# Patient Record
Sex: FEMALE | Race: WHITE | NOT HISPANIC OR LATINO | Employment: UNEMPLOYED | URBAN - METROPOLITAN AREA
[De-identification: names, ages, dates, MRNs, and addresses within clinical notes are randomized per-mention and may not be internally consistent; named-entity substitution may affect disease eponyms.]

---

## 2020-12-07 ENCOUNTER — OFFICE VISIT (OUTPATIENT)
Dept: OBSTETRICS AND GYNECOLOGY | Facility: CLINIC | Age: 30
End: 2020-12-07

## 2020-12-07 VITALS
SYSTOLIC BLOOD PRESSURE: 130 MMHG | WEIGHT: 197 LBS | DIASTOLIC BLOOD PRESSURE: 70 MMHG | TEMPERATURE: 97.5 F | BODY MASS INDEX: 31.66 KG/M2 | HEIGHT: 66 IN

## 2020-12-07 DIAGNOSIS — Z00.00 ANNUAL PHYSICAL EXAM: Primary | ICD-10-CM

## 2020-12-07 DIAGNOSIS — N94.6 DYSMENORRHEA: ICD-10-CM

## 2020-12-07 DIAGNOSIS — Z30.09 FAMILY PLANNING ADVICE: ICD-10-CM

## 2020-12-07 PROCEDURE — 99213 OFFICE O/P EST LOW 20 MIN: CPT | Performed by: OBSTETRICS & GYNECOLOGY

## 2020-12-07 PROCEDURE — 99385 PREV VISIT NEW AGE 18-39: CPT | Performed by: OBSTETRICS & GYNECOLOGY

## 2020-12-07 NOTE — PATIENT INSTRUCTIONS
Laparoscopic Tubal Ligation  Laparoscopic tubal ligation is a procedure to close the fallopian tubes. This is done so that you cannot get pregnant. When the fallopian tubes are closed, the eggs that your ovaries release cannot enter the uterus, and sperm cannot reach the released eggs.  You should not have this procedure if you want to get pregnant someday or if you are unsure about having more children.  Tell a health care provider about:  · Any allergies you have.  · All medicines you are taking, including vitamins, herbs, eye drops, creams, and over-the-counter medicines.  · Any problems you or family members have had with anesthetic medicines.  · Any blood disorders you have.  · Any surgeries you have had.  · Any medical conditions you have.  · Whether you are pregnant or may be pregnant.  · Any past pregnancies.  What are the risks?  Generally, this is a safe procedure. However, problems may occur, including:  · Infection.  · Bleeding.  · Injury to other organs in the abdomen.  · Side effects from anesthetic medicines.  · Failure of the procedure.  This procedure can increase your risk of a kind of pregnancy in which a fertilized egg attaches to the outside of the uterus (ectopic pregnancy).  What happens before the procedure?  Medicines  · Ask your health care provider about:  ? Changing or stopping your regular medicines. This is especially important if you are taking diabetes medicines or blood thinners.  ? Taking medicines such as aspirin and ibuprofen. These medicines can thin your blood. Do not take these medicines unless your health care provider tells you to take them.  ? Taking over-the-counter medicines, vitamins, herbs, and supplements.  Staying hydrated  · Follow instructions from your health care provider about hydration, which may include:  ? Up to 2 hours before the procedure - you may continue to drink clear liquids, such as water, clear fruit juice, black coffee, and plain tea.  Eating and  drinking  · Follow instructions from your health care provider about eating and drinking, which may include:  ? 8 hours before the procedure - stop eating heavy meals or foods, such as meat, fried foods, or fatty foods.  ? 6 hours before the procedure - stop eating light meals or foods, such as toast or cereal.  ? 6 hours before the procedure - stop drinking milk or drinks that contain milk.  ? 2 hours before the procedure - stop drinking clear liquids.  General instructions  · Do not use any products that contain nicotine or tobacco for at least 4 weeks before the procedure. These products include cigarettes, e-cigarettes, and chewing tobacco. If you need help quitting, ask your health care provider.  · Plan to have someone take you home from the hospital.  · If you will be going home right after the procedure, plan to have someone with you for 24 hours.  · Ask your health care provider:  ? How your surgery site will be marked.  ? What steps will be taken to help prevent infection. These may include:  § Removing hair at the surgery site.  § Washing skin with a germ-killing soap.  § Taking antibiotic medicine.  What happens during the procedure?         · An IV will be inserted into one of your veins.  · You will be given one or more of the following:  ? A medicine to help you relax (sedative).  ? A medicine to numb the area (local anesthetic).  ? A medicine to make you fall asleep (general anesthetic).  ? A medicine that is injected into an area of your body to numb everything below the injection site (regional anesthetic).  · Your bladder may be emptied with a small tube (catheter).  · If you have been given a general anesthetic, a tube will be put down your throat to help you breathe.  · Two small incisions will be made in your lower abdomen and near your belly button.  · Your abdomen will be inflated with a gas. This will let the surgeon see better and will give the surgeon room to work.  · A thin, lighted tube  (laparoscope) with a camera attached will be inserted into your abdomen through one of the incisions. Small instruments will be inserted through the other incision.  · The fallopian tubes will be tied off, burned (cauterized), or blocked with a clip, ring, or clamp. A small portion in the center of each fallopian tube may be removed.  · The gas will be released from the abdomen.  · The incisions will be closed with stitches (sutures).  · A bandage (dressing) will be placed over the incisions.  The procedure may vary among health care providers and hospitals.  What happens after the procedure?  · Your blood pressure, heart rate, breathing rate, and blood oxygen level will be monitored until you leave the hospital.  · You will be given medicine to help with pain, nausea, and vomiting as needed.  Summary  · Laparoscopic tubal ligation is a procedure that is done so that you cannot get pregnant.  · You should not have this procedure if you want to get pregnant someday or if you are unsure about having more children.  · The procedure is done using a thin, lighted tube (laparoscope) with a camera attached that will be inserted into your abdomen through an incision.  · Follow instructions from your health care provider about eating and drinking before the procedure.  This information is not intended to replace advice given to you by your health care provider. Make sure you discuss any questions you have with your health care provider.  Document Revised: 05/26/2020 Document Reviewed: 11/12/2019  [employer] Patient Education © 2020 [employer]

## 2020-12-07 NOTE — PROGRESS NOTES
GYN Annual Exam     CC - Here for annual exam.     Subjective   HPI  Cindy Keller is a 30 y.o. female, , who presents for annual well woman exam. Patient's last menstrual period was 2020 (approximate)..  Periods are irregular with IUD, lasting 10 days.  Dysmenorrhea:mild, occurring first 1-2 days of flow.  Patient reports problems with: none.  Partner Status: Marital Status: .  New Partners since last visit: no.  Desires STD Screening: no. She has a Mirena IUD and wants to discuss BTL . She has had PID twice since IUD insertion and wants a BTL. Currently her pain is well controlled with NSAIDs but she wants the IUD out regardless of placement.     US performed today and IUD in correct position. These results explained and all questions answered.     Additional OB/GYN History   Current contraception: contraceptive methods: IUD.  Insertion date: 2019  Desires to: continue contraception  Last Pap : 2019 negative  Last Completed Pap Smear       Status Date      PAP SMEAR No completions recorded        History of abnormal Pap smear: no  Family history of uterine, colon, breast, or ovarian cancer: no  Performs monthly Self-Breast Exam: yes  Exercises Regularly:yes  Feelings of Anxiety or Depression: no  Tobacco Usage?: Yes Cindy Keller  reports that she has been smoking cigarettes. She does not have any smokeless tobacco history on file.. I have educated her on the risk of diseases from using tobacco products such as cancer, COPD and heart disease.     I advised her to quit and she is not willing to quit.    I spent 3  minutes counseling the patient.        OB History        5    Para   4    Term   4            AB   1    Living   4       SAB   1    TAB        Ectopic        Molar        Multiple        Live Births   4                Health Maintenance   Topic Date Due   • Annual Gynecologic Pelvic and Breast Exam  1992   • ANNUAL PHYSICAL  1995   • Pneumococcal Vaccine  "0-64 (1 of 1 - PPSV23) 06/04/1998   • TDAP/TD VACCINES (1 - Tdap) 06/04/2011   • INFLUENZA VACCINE  08/01/2020   • HEPATITIS C SCREENING  12/07/2020   • PAP SMEAR  12/07/2020       The additional following portions of the patient's history were reviewed and updated as appropriate: allergies, current medications, past family history, past medical history, past social history, past surgical history and problem list.    Review of Systems   Constitutional: Negative for activity change and appetite change.   Eyes: Negative for visual disturbance.   Respiratory: Negative for cough and shortness of breath.    Cardiovascular: Negative for chest pain and palpitations.   Gastrointestinal: Negative for abdominal distention, abdominal pain, nausea and vomiting.   Genitourinary: Positive for pelvic pain. Negative for breast discharge, breast lump, breast pain and menstrual problem.   Musculoskeletal: Negative for back pain.   Neurological: Negative for light-headedness and headache.   Psychiatric/Behavioral: Negative for behavioral problems.       I have reviewed and agree with the HPI, ROS, and historical information as entered above. Lawrence Bonilla MD    Objective   /70   Temp 97.5 °F (36.4 °C)   Ht 167.6 cm (66\")   Wt 89.4 kg (197 lb)   LMP 11/07/2020 (Approximate)   BMI 31.80 kg/m²     Physical Exam  Exam conducted with a chaperone present.   Constitutional:       Appearance: Normal appearance. She is normal weight.   HENT:      Head: Normocephalic and atraumatic.   Cardiovascular:      Rate and Rhythm: Normal rate and regular rhythm.   Pulmonary:      Effort: Pulmonary effort is normal.      Breath sounds: Normal breath sounds.   Chest:      Breasts:         Right: Normal.         Left: Normal.   Abdominal:      General: Abdomen is flat. Bowel sounds are normal.      Palpations: Abdomen is soft.   Genitourinary:     General: Normal vulva.      Vagina: Normal.      Cervix: Normal.      Uterus: Normal.       " Adnexa: Right adnexa normal and left adnexa normal.      Rectum: Normal.         Skin:     General: Skin is warm and dry.   Neurological:      Mental Status: She is alert and oriented to person, place, and time.   Psychiatric:         Mood and Affect: Mood normal.         Behavior: Behavior normal.         Assessment/Plan     Assessment     Problem List Items Addressed This Visit     None      Visit Diagnoses     Annual physical exam    -  Primary    Relevant Orders    Pap IG, Ct-Ng TV Rfx HPV ASCU    External Facility Surgical/Procedural Request    Dysmenorrhea        Relevant Orders    US Non-ob Transvaginal    Family planning advice              1. GYN annual well woman exam.   2. Desires sterilization  3. Pelvic pain    Plan     1. Encouraged use of condoms for STD prevention.  2. Reviewed monthly self breast exams.  Instructed to call with lumps, pain, or breast discharge.    3. Reviewed exercise as a preventative health measures.   4. Smoking cessation encouraged.  Resources reviewed. (See above for full cessation details).  5. Reccommended Flu Vaccine in Fall of each year.  6. Other:  Will proceed with BTL and IUD removal in OR.       Lawrence Bonilla MD  12/07/2020

## 2020-12-17 DIAGNOSIS — Z00.00 ANNUAL PHYSICAL EXAM: ICD-10-CM

## 2021-01-18 ENCOUNTER — APPOINTMENT (OUTPATIENT)
Dept: PREADMISSION TESTING | Facility: HOSPITAL | Age: 31
End: 2021-01-18

## 2021-01-19 ENCOUNTER — OFFICE VISIT (OUTPATIENT)
Dept: OBSTETRICS AND GYNECOLOGY | Facility: CLINIC | Age: 31
End: 2021-01-19

## 2021-01-19 ENCOUNTER — APPOINTMENT (OUTPATIENT)
Dept: PREADMISSION TESTING | Facility: HOSPITAL | Age: 31
End: 2021-01-19

## 2021-01-19 VITALS
BODY MASS INDEX: 31.84 KG/M2 | DIASTOLIC BLOOD PRESSURE: 74 MMHG | SYSTOLIC BLOOD PRESSURE: 122 MMHG | WEIGHT: 198.1 LBS | HEIGHT: 66 IN

## 2021-01-19 DIAGNOSIS — Z30.2 ENCOUNTER FOR STERILIZATION: Primary | ICD-10-CM

## 2021-01-19 DIAGNOSIS — R10.2 PELVIC PAIN: ICD-10-CM

## 2021-01-19 PROCEDURE — U0004 COV-19 TEST NON-CDC HGH THRU: HCPCS

## 2021-01-19 PROCEDURE — C9803 HOPD COVID-19 SPEC COLLECT: HCPCS

## 2021-01-19 PROCEDURE — S0260 H&P FOR SURGERY: HCPCS | Performed by: OBSTETRICS & GYNECOLOGY

## 2021-01-19 NOTE — PROGRESS NOTES
Pre-Op Visit    Chief Complaint   Patient presents with   • Pre-op Exam       HPI  Cindy Keller is 30 y.o.  scheduled to have Bilateral Tubal Ligation with IUD removal at [facility] on 2021 at 9:30AM.  Her pre operative diagnosis is     ICD-10-CM ICD-9-CM   1. Encounter for sterilization  Z30.2 V25.2   2. Pelvic pain  R10.2 [id number]   . Her last menstrual period was No LMP recorded..  Her birth control method is IUD. Her BMI is Body mass index is 31.97 kg/m²..    She has review the informational video on 2021.    She has review the [facility] Pamphlet on 2021.    She understand the risks of bleeding, infections, possible damage to other organ systems, including but not limited to the gastrointestinal tract and genitourinary tract. She also understands the specific risks listed in the pre op information (video ,pamphlets, etc.)    She has review and signed the pre op consent form.    She has been instructed to have a light dinner the night before surgery, then nothing to eat or drink after midnight.  She is on the following medications:  No outpatient encounter medications on file as of 2021.     No facility-administered encounter medications on file as of 2021.          The day of surgery, do not chew gum or smoke. Remove all jewelry, nail polish and contact lens prior to coming to the hospital. Do not bring large sums of money or valuables.  Arrive at the hospital at 8:00 am.  You will talk with the anesthesiologist that morning.  An IV will be started to provide fluids and sedation.  The procedure will take approximately 1 hour(s).  You will need to have someone drive you home after the surgery.    Pre Admission testing has been scheduled for today and already performed. .    She has confirmed that she is not allergic to latex.      Review of Systems   Constitutional: Negative for activity change, appetite change, unexpected weight gain and unexpected weight loss.    Eyes: Negative for visual disturbance.   Respiratory: Negative for cough and shortness of breath.    Gastrointestinal: Negative for abdominal distention, abdominal pain, nausea and vomiting.   Genitourinary: Positive for pelvic pain.   Musculoskeletal: Negative for back pain.   Neurological: Negative for light-headedness and headache.   Psychiatric/Behavioral: Negative for behavioral problems.          Physical Exam  Vitals signs reviewed.   Constitutional:       Appearance: Normal appearance. She is normal weight.   HENT:      Head: Normocephalic and atraumatic.   Cardiovascular:      Rate and Rhythm: Normal rate.   Pulmonary:      Effort: Pulmonary effort is normal.      Breath sounds: Normal breath sounds.   Abdominal:      General: Abdomen is flat. Bowel sounds are normal.      Palpations: Abdomen is soft.   Skin:     General: Skin is warm and dry.   Neurological:      Mental Status: She is alert and oriented to person, place, and time.   Psychiatric:         Mood and Affect: Mood normal.         Behavior: Behavior normal.            Assessment:    ICD-10-CM ICD-9-CM   1. Encounter for sterilization  Z30.2 V25.2   2. Pelvic pain  R10.2 [id number]       Plan:  Orders Placed This Encounter   Procedures   • CBC (No Diff)   • HCG, B-subunit, Quantitative       Instructions:  1. Pre op labs done in our office  2. Review with the patient the risk of bleeding, infections, possible damage to other organ systems, including but not limited to the gastrointestinal tract and genitourinary tract.  Reviewed the risk of anesthesia as well as the risk the surgery will not produce the desired results.  Operative permit signed and scanned into the chart.  3. Reviewed the risk of perforation of the uterus.  4. BRETHA report has been reviewed.  5. Pain Medication Consent Form has been signed.  A review regarding proper medication administration; impact on driving and working while medicated; the safety of use in pregnancy; the  potential for overdose; and the proper disposal and stroage of controlled medications has been done with the patient.  6. Electronically Identified Patient Education Materials provided electronically      Lawrence Bonilla MD   01/19/21

## 2021-01-20 LAB
ERYTHROCYTE [DISTWIDTH] IN BLOOD BY AUTOMATED COUNT: 13 % (ref 12.3–15.4)
HCG INTACT+B SERPL-ACNC: <0.5 MIU/ML
HCT VFR BLD AUTO: 40.9 % (ref 34–46.6)
HGB BLD-MCNC: 14.2 G/DL (ref 12–15.9)
MCH RBC QN AUTO: 29.7 PG (ref 26.6–33)
MCHC RBC AUTO-ENTMCNC: 34.7 G/DL (ref 31.5–35.7)
MCV RBC AUTO: 85.6 FL (ref 79–97)
PLATELET # BLD AUTO: 271 10*3/MM3 (ref 140–450)
RBC # BLD AUTO: 4.78 10*6/MM3 (ref 3.77–5.28)
SARS-COV-2 RNA RESP QL NAA+PROBE: NOT DETECTED
WBC # BLD AUTO: 9.46 10*3/MM3 (ref 3.4–10.8)

## 2021-01-21 ENCOUNTER — OUTSIDE FACILITY SERVICE (OUTPATIENT)
Dept: OBSTETRICS AND GYNECOLOGY | Facility: CLINIC | Age: 31
End: 2021-01-21

## 2021-01-21 DIAGNOSIS — N94.6 DYSMENORRHEA: Primary | ICD-10-CM

## 2021-01-21 PROCEDURE — 58301 REMOVE INTRAUTERINE DEVICE: CPT | Performed by: OBSTETRICS & GYNECOLOGY

## 2021-01-21 PROCEDURE — 58671 LAPAROSCOPY TUBAL BLOCK: CPT | Performed by: OBSTETRICS & GYNECOLOGY

## 2021-01-21 RX ORDER — PROMETHAZINE HYDROCHLORIDE 12.5 MG/1
12.5 TABLET ORAL EVERY 6 HOURS PRN
Qty: 12 TABLET | Refills: 0 | Status: SHIPPED | OUTPATIENT
Start: 2021-01-21 | End: 2021-03-11

## 2021-01-21 RX ORDER — HYDROCODONE BITARTRATE AND ACETAMINOPHEN 5; 325 MG/1; MG/1
1 TABLET ORAL EVERY 6 HOURS PRN
Qty: 12 TABLET | Refills: 0 | Status: SHIPPED | OUTPATIENT
Start: 2021-01-21 | End: 2021-03-11

## 2021-01-21 RX ORDER — IBUPROFEN 800 MG/1
800 TABLET ORAL EVERY 8 HOURS PRN
Qty: 30 TABLET | Refills: 3 | Status: SHIPPED | OUTPATIENT
Start: 2021-01-21

## 2021-02-01 ENCOUNTER — OFFICE VISIT (OUTPATIENT)
Dept: OBSTETRICS AND GYNECOLOGY | Facility: CLINIC | Age: 31
End: 2021-02-01

## 2021-02-01 VITALS
BODY MASS INDEX: 31.47 KG/M2 | DIASTOLIC BLOOD PRESSURE: 70 MMHG | SYSTOLIC BLOOD PRESSURE: 110 MMHG | TEMPERATURE: 97.3 F | WEIGHT: 195 LBS

## 2021-02-01 DIAGNOSIS — Z09 POSTOPERATIVE EXAMINATION: Primary | ICD-10-CM

## 2021-02-01 PROCEDURE — 99024 POSTOP FOLLOW-UP VISIT: CPT | Performed by: OBSTETRICS & GYNECOLOGY

## 2021-02-01 NOTE — PROGRESS NOTES
Subjective   Chief Complaint   Patient presents with   • Post-op     Cindy Keller is a 30 y.o. year old  presenting to be seen for her post-operative visit.  Currently she reports pain is well controlled.The patient had a BTL and Mirena removal on 21.    There was no pathology result associated with Cindy's recent procedure.      OTHER THINGS SHE WANTS TO DISCUSS TODAY:  Nothing else    The following portions of the patient's history were reviewed and updated as appropriate:no additional history reviewed       Objective   /70   Temp 97.3 °F (36.3 °C)   Wt 88.5 kg (195 lb)   BMI 31.47 kg/m²     General:  well developed; well nourished  no acute distress   Abdomen: soft, non-tender; no masses  no umbilical or inguinal hernias are present  no hepato-splenomegaly   Pelvis: Not performed.          Assessment   1. S/P laparoscopic bilateral tubal ligation and IUD removal.      Plan   1. She is doing well with no evidence of complications or infection.   2. Will f/u as needed or 1 year for annual.   Lawrence Bonilla MD  21

## 2021-03-11 ENCOUNTER — OFFICE VISIT (OUTPATIENT)
Dept: OBSTETRICS AND GYNECOLOGY | Facility: CLINIC | Age: 31
End: 2021-03-11

## 2021-03-11 VITALS
BODY MASS INDEX: 31.02 KG/M2 | HEIGHT: 66 IN | WEIGHT: 193 LBS | TEMPERATURE: 97.5 F | DIASTOLIC BLOOD PRESSURE: 70 MMHG | SYSTOLIC BLOOD PRESSURE: 118 MMHG

## 2021-03-11 DIAGNOSIS — R10.2 PELVIC PAIN: Primary | ICD-10-CM

## 2021-03-11 DIAGNOSIS — R10.32 LLQ PAIN: ICD-10-CM

## 2021-03-11 DIAGNOSIS — N83.201 CYST OF RIGHT OVARY: ICD-10-CM

## 2021-03-11 PROCEDURE — 99213 OFFICE O/P EST LOW 20 MIN: CPT | Performed by: NURSE PRACTITIONER

## 2021-03-11 NOTE — PROGRESS NOTES
"Chief Complaint   Patient presents with   • Pelvic Pain         Subjective   HPI  Cindy Keller is a 30 y.o. female, , who presents with evaluation of pelvic pain.  She states that when the pain started it was coming & going, yesterday the pain was constant. She describes the pain as a 'stabbing, sharp pain\". She denies vaginal discharge, itching, odor. She is having some slight nausea, but no vomiting. She is having diarrhea. Denies dysuria.  She denies the need for STD testing.     She states she has acute pelvic pain.  She states she has experienced this problem for 2 days.  She describes the severity as severe.  She rates her pain score as a 10/10.  She states that the problem is waxing and waning.  She states the pain is located in the right lower quadrant and it does radiate.  Patient notes aggravating factors include none and alleviating factors are none.  The patient reports additional symptoms as none.  The patient has not previously been evaluated for pelvic pain.    Her last LMP was Patient's last menstrual period was 2021 (approximate)..    Problems with GI: yes - diarrhea  History of urinary disease: no  Concern for Anxiety or Depression: no  Exercises Regularly: no  Tobacco Usage?: Yes Cindy Keller  reports that she has been smoking. She uses smokeless tobacco.. I have educated her on the risk of diseases from using tobacco products such as cancer, COPD and heart disease. She uses a vapor cigarette    I advised her to quit and she is not willing to quit.    I spent 3  minutes counseling the patient.          Additional OB/GYN History   Last Pap :   Last Completed Pap Smear       Status Date      PAP SMEAR Done 2020 PAP IG, CT-NG TV RFX HPV ASCU     Patient has more history with this topic...        History of abnormal Pap smear: no  OB History        5    Para   4    Term   4            AB   1    Living   4       SAB   1    TAB        Ectopic        Molar        " "Multiple        Live Births   4                The additional following portions of the patient's history were reviewed and updated as appropriate: allergies, current medications, past family history, past medical history, past social history, past surgical history and problem list.    Did the patient have u/s today? Yes.  Findings showed EMC= 7.4mm, Approx 2.2 right ovarian complex cyst.  I have personally evaluated the U/S and agree with the findings. ALE Gurrola    Review of Systems   Constitutional: Negative.    Gastrointestinal: Positive for diarrhea.   Genitourinary: Positive for pelvic pain.   Psychiatric/Behavioral: Negative.    All other systems reviewed and are negative.    All other systems reviewed and are negative.     I have reviewed and agree with the HPI, ROS, and historical information as entered above. ALE Gurrola    Objective   /70   Temp 97.5 °F (36.4 °C)   Ht 167.6 cm (66\")   Wt 87.5 kg (193 lb)   LMP 02/22/2021 (Approximate)   Breastfeeding No   BMI 31.15 kg/m²     Physical Exam  Exam conducted with a chaperone present.   Constitutional:       Appearance: Normal appearance.   Cardiovascular:      Rate and Rhythm: Normal rate and regular rhythm.   Genitourinary:     General: Normal vulva.      Vagina: Normal.      Cervix: Normal.      Uterus: Normal.       Adnexa:         Right: Tenderness present.         Left: Tenderness present.       Rectum: Normal.   Neurological:      Mental Status: She is alert.         Assessment/Plan     Assessment and Plan    Problem List Items Addressed This Visit        Gastrointestinal Abdominal     Pelvic pain - Primary    Relevant Orders    US Non-ob Transvaginal      Other Visit Diagnoses     LLQ pain        Relevant Orders    US Non-ob Transvaginal          1. Ultrasound findings reviewed with patient. Approx 2.2cm complex right ovarian cyst present  2. Advised ibuprofen for pain, warm tub bath, heating pad to area.  3. Will need " follow up ultrasound in 2 months to recheck complex right ovarian cyst.       Lulu Martin, APRN  03/11/2021

## 2021-05-13 ENCOUNTER — OFFICE VISIT (OUTPATIENT)
Dept: OBSTETRICS AND GYNECOLOGY | Facility: CLINIC | Age: 31
End: 2021-05-13

## 2021-05-13 VITALS
BODY MASS INDEX: 30.22 KG/M2 | DIASTOLIC BLOOD PRESSURE: 80 MMHG | SYSTOLIC BLOOD PRESSURE: 118 MMHG | WEIGHT: 188 LBS | HEIGHT: 66 IN | TEMPERATURE: 97.8 F

## 2021-05-13 DIAGNOSIS — N83.201 CYST OF RIGHT OVARY: Primary | ICD-10-CM

## 2021-05-13 PROCEDURE — 99213 OFFICE O/P EST LOW 20 MIN: CPT | Performed by: NURSE PRACTITIONER

## 2021-05-13 NOTE — PROGRESS NOTES
Chief Complaint   Patient presents with   • Follow-up       Subjective   HPI  Cindy Keller is a 30 y.o. female, , who presents for follow up ultrasound for ovarian cyst. 2 weeks ago she had another episode of RLQ pain that lasted about 4 days but has had no further problems.    Her last LMP was No LMP recorded..  Periods are regular every 28-30 days, lasting 4-6 days.  Patient reports problems with: none.  Partner Status: Marital Status: .  New Partners since last visit: no.  Desires STD Screening: no.    Additional OB/GYN History   Current contraception: contraceptive methods: Tubal ligation  Desires to: do not start contraception  Last Pap :   Last Completed Pap Smear          PAP SMEAR (Every 3 Years) Next due on 2020  Pap IG, Ct-Ng TV Rfx HPV ASCU    2020  Liquid-based Pap Smear, Screening              History of abnormal Pap smear: no  Last mammogram:   Last Completed Mammogram     This patient has no relevant Health Maintenance data.        Tobacco Usage?: Yes Cindy Keller  reports that she has been smoking. She uses smokeless tobacco.. I have educated her on the risk of diseases from using tobacco products such as cancer, COPD and heart disease.     I advised her to quit and she is not willing to quit.    I spent 3  minutes counseling the patient.        OB History        5    Para   4    Term   4            AB   1    Living   4       SAB   1    TAB        Ectopic        Molar        Multiple        Live Births   4                Health Maintenance   Topic Date Due   • Annual Gynecologic Pelvic and Breast Exam  Never done   • Pneumococcal Vaccine 0-64 (1 of 1 - PPSV23) Never done   • COVID-19 Vaccine (1) Never done   • INFLUENZA VACCINE  2021   • ANNUAL PHYSICAL  2021   • PAP SMEAR  2023   • TDAP/TD VACCINES (3 - Td) 2028   • HEPATITIS C SCREENING  Completed       The additional following portions of the patient's history were  "reviewed and updated as appropriate: allergies, current medications, past family history, past medical history, past social history, past surgical history and problem list.    Review of Systems   Constitutional: Negative.    Cardiovascular: Negative.    Gastrointestinal: Negative.    Endocrine: Negative.    Genitourinary: Negative.    Skin: Negative.    Psychiatric/Behavioral: Negative.        I have reviewed and agree with the HPI, ROS, and historical information as entered above. Lulu Martin, APRN    Objective   /80   Temp 97.8 °F (36.6 °C)   Ht 167.6 cm (66\")   Wt 85.3 kg (188 lb)   Breastfeeding No   BMI 30.34 kg/m²     Physical Exam  Vitals and nursing note reviewed.   Constitutional:       Appearance: Normal appearance.   Neurological:      Mental Status: She is alert.         Assessment/Plan     Assessment     Problem List Items Addressed This Visit     None          1. Ovarian cyst follow up  2. Pelvic ultrasound shows previous ovarian cyst has now resolved    Plan     Ultrasound findings reviewed with pt. Ultrasound shows ovarian complex cyst from previous scan on 3/11/21 has resolved.   2.   Will continue ibuprofen prn if cramping continues again.       ALE Gurrola  05/13/2021  "

## 2025-07-05 ENCOUNTER — HOSPITAL ENCOUNTER (EMERGENCY)
Age: 35
Discharge: HOME | End: 2025-07-05
Payer: MEDICAID

## 2025-07-05 VITALS
WEIGHT: 148.8 LBS | RESPIRATION RATE: 16 BRPM | BODY MASS INDEX: 23.91 KG/M2 | DIASTOLIC BLOOD PRESSURE: 90 MMHG | OXYGEN SATURATION: 100 % | SYSTOLIC BLOOD PRESSURE: 128 MMHG | TEMPERATURE: 97.7 F | HEIGHT: 66 IN | HEART RATE: 75 BPM

## 2025-07-05 DIAGNOSIS — N93.9 VAGINAL BLEEDING: Primary | ICD-10-CM

## 2025-07-05 LAB
ALBUMIN SERPL-MCNC: 4.6 G/DL (ref 3.4–5)
ALBUMIN/GLOB SERPL: 1.8 {RATIO} (ref 1.1–2.2)
ALP SERPL-CCNC: 102 U/L (ref 40–129)
ALT SERPL-CCNC: 15 U/L (ref 10–40)
ANION GAP SERPL CALCULATED.3IONS-SCNC: 13 MMOL/L (ref 3–16)
AST SERPL-CCNC: 18 U/L (ref 15–37)
B-HCG SERPL EIA 3RD IS-ACNC: <5 MIU/ML
BASOPHILS # BLD: 0 K/UL (ref 0–0.2)
BASOPHILS NFR BLD: 0.4 %
BILIRUB SERPL-MCNC: 0.8 MG/DL (ref 0–1)
BUN SERPL-MCNC: 8 MG/DL (ref 7–20)
CALCIUM SERPL-MCNC: 9.4 MG/DL (ref 8.3–10.6)
CHLORIDE SERPL-SCNC: 104 MMOL/L (ref 99–110)
CO2 SERPL-SCNC: 24 MMOL/L (ref 21–32)
CREAT SERPL-MCNC: 1 MG/DL (ref 0.6–1.1)
DEPRECATED RDW RBC AUTO: 13.7 % (ref 12.4–15.4)
EOSINOPHIL # BLD: 0.1 K/UL (ref 0–0.6)
EOSINOPHIL NFR BLD: 1.1 %
GFR SERPLBLD CREATININE-BSD FMLA CKD-EPI: 76 ML/MIN/{1.73_M2}
GLUCOSE SERPL-MCNC: 111 MG/DL (ref 70–99)
HCT VFR BLD AUTO: 43.9 % (ref 36–48)
HGB BLD-MCNC: 15.2 G/DL (ref 12–16)
LYMPHOCYTES # BLD: 2 K/UL (ref 1–5.1)
LYMPHOCYTES NFR BLD: 20 %
MAGNESIUM SERPL-MCNC: 2.49 MG/DL (ref 1.8–2.4)
MCH RBC QN AUTO: 30.9 PG (ref 26–34)
MCHC RBC AUTO-ENTMCNC: 34.7 G/DL (ref 31–36)
MCV RBC AUTO: 89.2 FL (ref 80–100)
MONOCYTES # BLD: 0.5 K/UL (ref 0–1.3)
MONOCYTES NFR BLD: 5.1 %
NEUTROPHILS # BLD: 7.1 K/UL (ref 1.7–7.7)
NEUTROPHILS NFR BLD: 73.4 %
PLATELET # BLD AUTO: 233 K/UL (ref 135–450)
PMV BLD AUTO: 9.7 FL (ref 5–10.5)
POTASSIUM SERPL-SCNC: 3.3 MMOL/L (ref 3.5–5.1)
PROT SERPL-MCNC: 7.2 G/DL (ref 6.4–8.2)
RBC # BLD AUTO: 4.92 M/UL (ref 4–5.2)
SODIUM SERPL-SCNC: 141 MMOL/L (ref 136–145)
WBC # BLD AUTO: 9.7 K/UL (ref 4–11)

## 2025-07-05 PROCEDURE — 84702 CHORIONIC GONADOTROPIN TEST: CPT

## 2025-07-05 PROCEDURE — 6370000000 HC RX 637 (ALT 250 FOR IP): Performed by: PHYSICIAN ASSISTANT

## 2025-07-05 PROCEDURE — 80053 COMPREHEN METABOLIC PANEL: CPT

## 2025-07-05 PROCEDURE — 83735 ASSAY OF MAGNESIUM: CPT

## 2025-07-05 PROCEDURE — 99283 EMERGENCY DEPT VISIT LOW MDM: CPT

## 2025-07-05 PROCEDURE — 85025 COMPLETE CBC W/AUTO DIFF WBC: CPT

## 2025-07-05 RX ORDER — HYDRALAZINE HYDROCHLORIDE 10 MG/1
10 TABLET, FILM COATED ORAL 3 TIMES DAILY
COMMUNITY

## 2025-07-05 RX ORDER — BUPROPION HYDROCHLORIDE 300 MG/1
300 TABLET ORAL EVERY MORNING
COMMUNITY

## 2025-07-05 RX ORDER — ACETAMINOPHEN 325 MG/1
650 TABLET ORAL ONCE
Status: DISCONTINUED | OUTPATIENT
Start: 2025-07-05 | End: 2025-07-05 | Stop reason: HOSPADM

## 2025-07-05 RX ORDER — IBUPROFEN 800 MG/1
800 TABLET, FILM COATED ORAL ONCE
Status: DISCONTINUED | OUTPATIENT
Start: 2025-07-05 | End: 2025-07-05 | Stop reason: HOSPADM

## 2025-07-05 RX ADMIN — POTASSIUM BICARBONATE 40 MEQ: 782 TABLET, EFFERVESCENT ORAL at 10:49

## 2025-07-05 ASSESSMENT — PAIN SCALES - GENERAL: PAINLEVEL_OUTOF10: 2

## 2025-07-05 ASSESSMENT — PAIN DESCRIPTION - LOCATION: LOCATION: VAGINA

## 2025-07-05 ASSESSMENT — PAIN - FUNCTIONAL ASSESSMENT: PAIN_FUNCTIONAL_ASSESSMENT: 0-10

## 2025-07-05 NOTE — ED NOTES
Discharge instructions reviewed with patient prior to DC. Patient verbalized understanding and ambulated to the lobby with steady gait.

## 2025-07-05 NOTE — DISCHARGE INSTRUCTIONS
You can use OTC Tylenol every 4-6 hours and Motrin every 8 hours as needed for pain control.  Follow-up primary care provider as well as OB/GYN.

## 2025-07-08 NOTE — ED PROVIDER NOTES
temperature 97.7 °F (36.5 °C), temperature source Oral, resp. rate 16, height 1.676 m (5' 6\"), weight 67.5 kg (148 lb 12.8 oz), last menstrual period 05/12/2025, SpO2 100%.   DISPOSITION  Patient was discharged to home in good condition.          Kam Elliott PA-C  07/08/25 1027